# Patient Record
Sex: FEMALE | Race: WHITE
[De-identification: names, ages, dates, MRNs, and addresses within clinical notes are randomized per-mention and may not be internally consistent; named-entity substitution may affect disease eponyms.]

---

## 2018-05-27 ENCOUNTER — HOSPITAL ENCOUNTER (EMERGENCY)
Dept: HOSPITAL 25 - UCEAST | Age: 23
Discharge: HOME | End: 2018-05-27
Payer: COMMERCIAL

## 2018-05-27 VITALS — DIASTOLIC BLOOD PRESSURE: 82 MMHG | SYSTOLIC BLOOD PRESSURE: 133 MMHG

## 2018-05-27 DIAGNOSIS — Z23: ICD-10-CM

## 2018-05-27 DIAGNOSIS — S61.232D: Primary | ICD-10-CM
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
## 2018-05-27 DIAGNOSIS — Z20.3: ICD-10-CM

## 2018-05-27 PROCEDURE — G0463 HOSPITAL OUTPT CLINIC VISIT: HCPCS

## 2018-05-27 PROCEDURE — 99201: CPT

## 2018-05-27 PROCEDURE — 90471 IMMUNIZATION ADMIN: CPT

## 2018-05-27 NOTE — UC
Bite Injury/Animal HPI





- HPI Summary


HPI Summary: 





21 yo female s/p feral cat bite to fright middle finger 5/18 5/19 had ER visit for I and D


started on augmentin


states finger much improved


had rabies immunization in 2016


booster 2017


suggested to have 2 booster dose for this exposure











- History of Current Complaint


Chief Complaint: UCBiteInjury


Stated Complaint: RABIES POST EXPOSURE


Time Seen by Provider: 05/27/18 07:27


Hx Obtained From: Patient


Hx Last Menstrual Period: 5/1/18


Severity Currently: Mild


Severity Initially: Severe


Pain Intensity: 0


Pain Scale Used: 0-10 Numeric


Onset/Duration: Sudden Onset


Type of Bite: Animal


Has Animal Been Immunized?: Unknown - feral cat


Character: Puncture


Animal Available for Observation: No


Animal Control Notified: Yes


Body - Head: 


  __________________________














  __________________________





 1 - bite was incised and drained in [place] about a week ago, some mild 

swelling/no erthyema








- Allergies/Home Medications


Allergies/Adverse Reactions: 


 Allergies











Allergy/AdvReac Type Severity Reaction Status Date / Time


 


No Known Allergies Allergy   Verified 05/27/18 07:16











Home Medications: 


 Home Medications





Amoxicillin PO (*) [Amoxicillin 500 MG CAP*] 500 mg PO Q12H 05/27/18 [History 

Confirmed 05/27/18]


Dextroamphetamine/Amphetamine [Adderall Xr 20 mg Capsule] 25 mg PO DAILY 05/27/ 18 [History Confirmed 05/27/18]











PMH/Surg Hx/FS Hx/Imm Hx


Previously Healthy: Yes





- Surgical History


Surgical History: None





- Family History


Known Family History: Positive: Hypertension





- Social History


Alcohol Use: Rare


Substance Use Type: None


Smoking Status (MU): Never Smoked Tobacco





- Immunization History


Most Recent Tetanus Shot: 12/2018





Review of Systems


Constitutional: Negative


Skin: Negative


Eyes: Negative


ENT: Negative


Respiratory: Negative


Cardiovascular: Negative


Gastrointestinal: Negative


Genitourinary: Negative


Motor: Negative


Neurovascular: Negative


Musculoskeletal: Negative


Neurological: Negative


Psychological: Negative


Is Patient Immunocompromised?: No


All Other Systems Reviewed And Are Negative: Yes





Physical Exam


Triage Information Reviewed: Yes


Appearance: Well-Appearing, No Pain Distress, Well-Nourished


Vital Signs: 


 Initial Vital Signs











Temp  98.8 F   05/27/18 07:11


 


Pulse  94   05/27/18 07:11


 


Resp  16   05/27/18 07:11


 


BP  133/82   05/27/18 07:11


 


Pulse Ox  100   05/27/18 07:11











Vital Signs Reviewed: Yes


Eye Exam: Normal


Eyes: Positive: Conjunctiva Clear


ENT: Positive: Hearing grossly normal, Uvula midline.  Negative: Nasal 

congestion, Nasal drainage, Trismus, Muffled voice, Hoarse voice


Neck: Positive: Supple, Nontender


Respiratory: Positive: Lungs clear, Normal breath sounds, No respiratory 

distress


Cardiovascular: Positive: RRR, No Murmur


Musculoskeletal: Positive: ROM Intact, No Edema


Neurological: Positive: Alert


Skin Exam: Other - see image





Bite Injury Course/Dx





- Differential Dx/Diagnosis


Provider Diagnoses: feral cat bite right middle finger.  rabies booster





Discharge





- Sign-Out/Discharge


Documenting (check all that apply): Discharge/Admit/Transfer





- Discharge Plan


Condition: Stable


Disposition: HOME


Patient Education Materials:  Rabies Vaccine (ED)


Referrals: 


No Primary Care Phys,NOPCP [Primary Care Provider] - 


Additional Instructions: 


return 5/30





If your finger worsens at any point you need to get rechecked ASAP











- Billing Disposition and Condition


Condition: STABLE


Disposition: HOME

## 2018-05-30 ENCOUNTER — HOSPITAL ENCOUNTER (EMERGENCY)
Dept: HOSPITAL 25 - UCEAST | Age: 23
Discharge: HOME | End: 2018-05-30
Payer: COMMERCIAL

## 2018-05-30 VITALS — SYSTOLIC BLOOD PRESSURE: 127 MMHG | DIASTOLIC BLOOD PRESSURE: 79 MMHG

## 2018-05-30 DIAGNOSIS — Z23: ICD-10-CM

## 2018-05-30 DIAGNOSIS — Z82.49: ICD-10-CM

## 2018-05-30 DIAGNOSIS — Z20.3: Primary | ICD-10-CM

## 2018-05-30 PROCEDURE — G0463 HOSPITAL OUTPT CLINIC VISIT: HCPCS

## 2018-05-30 PROCEDURE — 99211 OFF/OP EST MAY X REQ PHY/QHP: CPT

## 2018-05-30 PROCEDURE — 96372 THER/PROPH/DIAG INJ SC/IM: CPT

## 2018-05-30 NOTE — UC
Shirlene LIMON Elizabeth, scribed for Cassy Melendrez MD on 05/30/18 at 0740 .





 General HPI





- HPI Summary


HPI Summary: 


This patient is a 22 year old F presenting to [facility] for a follow-up rabies 

booster vaccine after receiving a 1st booster at [facility] on 5/26/18. The patient 

was recommended to receive the rabies booster vaccine by the health department 

following a bite from a feral cat. The patient reports that the wound has 

scabbed over and healed well. The patient rates the pain 0/10 in severity. 

Symptoms aggravated by nothing. Symptoms alleviated by nothing. Patient denies 

fever, chills, nausea, and pregnancy.





Pt's medications reviewed this visit


pt without questions or concerns











- History of Current Complaint


Chief Complaint: UCGeneralIllness


Stated Complaint: RABIES BOOSTER


Time Seen by Provider: 05/30/18 07:09


Hx Obtained From: Patient


Hx Last Menstrual Period: may 1


Onset/Duration: Sudden Onset, Resolved


Onset Severity: Mild


Current Severity: None


Pain Intensity: 0


Pain Location at: none


Aggravating: nothing


Alleviating: nothing


Associated Signs & Symptoms: Positive: Other - NEGATIVE CHILLS.  Negative: Fever

, Nausea





- Allergy/Home Medications


Allergies/Adverse Reactions: 


 Allergies











Allergy/AdvReac Type Severity Reaction Status Date / Time


 


No Known Allergies Allergy   Verified 05/30/18 07:18














PMH/Surg Hx/FS Hx/Imm Hx


Previously Healthy: Yes





- Surgical History


Surgical History: None





- Family History


Known Family History: Positive: Hypertension





- Social History


Occupation: Employed Full-time


Lives: With Family


Alcohol Use: Rare


Substance Use Type: None


Smoking Status (MU): Never Smoked Tobacco





- Immunization History


Most Recent Tetanus Shot: 12/2018





Review of Systems


Constitutional: Negative - NEGATIVE CHILLS, NEGATIVE FEVER


ENT: Negative - NEGATIVE EPISTAXIS


Cardiovascular: Negative - NEGATIVE CHEST PAIN


Gastrointestinal: Negative - NEGATIVE NAUSEA


All Other Systems Reviewed And Are Negative: Yes





Physical Exam





- Summary


Physical Exam Summary: 





Vital Signs Reviewed: Yes


A+Ox3, no distress


Eyes: Conjunctiva Clear


ENT: Hearing grossly normal  


neck: supple


Respiratory: Positive: No respiratory distress, No accessory muscle use 


Cardiovascular: skin color reflect adequate perfusion


Musculoskeletal Exam: JEFFRIES x 4 without difficulty 


Neurological: Positive: Alert,  ambualtory without difficulty


Psychological: Positive: Normal Response To Family


Skin: Positive: no rash, no ecchymosis


Triage Information Reviewed: Yes


Vital Signs: 


 Initial Vital Signs











Temp  98.8 F   05/30/18 07:19


 


Pulse  91   05/30/18 07:19


 


Resp  18   05/30/18 07:19


 


BP  127/79   05/30/18 07:19


 


Pulse Ox  100   05/30/18 07:19














Course/Dx





- Course


Course Of Treatment: Pt presents for 2/2 rabies booster.  pt without questions 

or concerns related to recent 1/2 booster





- Differential Dx - Multi-Symptom


Provider Diagnoses: encounter for rabies booster





Discharge





- Sign-Out/Discharge


Documenting (check all that apply): Discharge/Admit/Transfer





- Discharge Plan


Condition: Stable


Disposition: HOME


Patient Education Materials:  Rabies Vaccine (ED)


Referrals: 


No Primary Care Phys,NOPCP [Primary Care Provider] - 


Additional Instructions: 


 


Okay to take ibuprofen (Advil, Motrin) or tylenol as needed for discomfort 

related to the vaccination


complete the antibiotics as previously prescribed for your wound. Monitor for 

signs of infection. If you develop reddness, red streaking, fever, drainage or 

other concerns contact your doctor, return here or go to the emergency 

department for futher evaluation and treatment








- Billing Disposition and Condition


Condition: STABLE


Disposition: HOME





The documentation as recorded by the Shirlene good Elizabeth accurately 

reflects the service I personally performed and the decisions made by me, 

Cassy Melendrez MD.